# Patient Record
Sex: FEMALE | Race: WHITE | Employment: OTHER | URBAN - METROPOLITAN AREA
[De-identification: names, ages, dates, MRNs, and addresses within clinical notes are randomized per-mention and may not be internally consistent; named-entity substitution may affect disease eponyms.]

---

## 2021-07-16 ENCOUNTER — APPOINTMENT (OUTPATIENT)
Dept: CT IMAGING | Age: 36
End: 2021-07-16
Attending: PHYSICIAN ASSISTANT
Payer: COMMERCIAL

## 2021-07-16 ENCOUNTER — APPOINTMENT (OUTPATIENT)
Dept: ULTRASOUND IMAGING | Age: 36
End: 2021-07-16
Attending: PHYSICIAN ASSISTANT
Payer: COMMERCIAL

## 2021-07-16 ENCOUNTER — HOSPITAL ENCOUNTER (EMERGENCY)
Age: 36
Discharge: HOME | End: 2021-07-16
Attending: STUDENT IN AN ORGANIZED HEALTH CARE EDUCATION/TRAINING PROGRAM
Payer: COMMERCIAL

## 2021-07-16 VITALS
RESPIRATION RATE: 18 BRPM | BODY MASS INDEX: 27.32 KG/M2 | SYSTOLIC BLOOD PRESSURE: 111 MMHG | WEIGHT: 170 LBS | OXYGEN SATURATION: 98 % | HEART RATE: 89 BPM | DIASTOLIC BLOOD PRESSURE: 72 MMHG | HEIGHT: 66 IN | TEMPERATURE: 97.5 F

## 2021-07-16 DIAGNOSIS — N83.201 CYST OF RIGHT OVARY: Primary | ICD-10-CM

## 2021-07-16 LAB
ALBUMIN SERPL-MCNC: 3.9 G/DL (ref 3.5–5)
ALBUMIN/GLOB SERPL: 0.9 {RATIO} (ref 1.1–2.2)
ALP SERPL-CCNC: 113 U/L (ref 45–117)
ALT SERPL-CCNC: 27 U/L (ref 12–78)
ANION GAP SERPL CALC-SCNC: 5 MMOL/L (ref 5–15)
APPEARANCE UR: ABNORMAL
AST SERPL-CCNC: 22 U/L (ref 15–37)
BACTERIA URNS QL MICRO: ABNORMAL /HPF
BASOPHILS # BLD: 0.1 K/UL (ref 0–0.1)
BASOPHILS NFR BLD: 1 % (ref 0–1)
BILIRUB SERPL-MCNC: 0.2 MG/DL (ref 0.2–1)
BILIRUB UR QL: NEGATIVE
BUN SERPL-MCNC: 12 MG/DL (ref 6–20)
BUN/CREAT SERPL: 18 (ref 12–20)
CALCIUM SERPL-MCNC: 9.1 MG/DL (ref 8.5–10.1)
CHLORIDE SERPL-SCNC: 109 MMOL/L (ref 97–108)
CO2 SERPL-SCNC: 25 MMOL/L (ref 21–32)
COLOR UR: ABNORMAL
CREAT SERPL-MCNC: 0.66 MG/DL (ref 0.55–1.02)
DIFFERENTIAL METHOD BLD: ABNORMAL
EOSINOPHIL # BLD: 0.4 K/UL (ref 0–0.4)
EOSINOPHIL NFR BLD: 3 % (ref 0–7)
EPITH CASTS URNS QL MICRO: ABNORMAL /LPF
ERYTHROCYTE [DISTWIDTH] IN BLOOD BY AUTOMATED COUNT: 12.7 % (ref 11.5–14.5)
GLOBULIN SER CALC-MCNC: 4.3 G/DL (ref 2–4)
GLUCOSE SERPL-MCNC: 96 MG/DL (ref 65–100)
GLUCOSE UR STRIP.AUTO-MCNC: NEGATIVE MG/DL
HCG UR QL: NEGATIVE
HCT VFR BLD AUTO: 44.3 % (ref 35–47)
HGB BLD-MCNC: 14.7 G/DL (ref 11.5–16)
HGB UR QL STRIP: ABNORMAL
HYALINE CASTS URNS QL MICRO: ABNORMAL /LPF (ref 0–5)
IMM GRANULOCYTES # BLD AUTO: 0 K/UL (ref 0–0.04)
IMM GRANULOCYTES NFR BLD AUTO: 0 % (ref 0–0.5)
KETONES UR QL STRIP.AUTO: NEGATIVE MG/DL
LEUKOCYTE ESTERASE UR QL STRIP.AUTO: NEGATIVE
LIPASE SERPL-CCNC: 139 U/L (ref 73–393)
LYMPHOCYTES # BLD: 4.4 K/UL (ref 0.8–3.5)
LYMPHOCYTES NFR BLD: 37 % (ref 12–49)
MCH RBC QN AUTO: 30 PG (ref 26–34)
MCHC RBC AUTO-ENTMCNC: 33.2 G/DL (ref 30–36.5)
MCV RBC AUTO: 90.4 FL (ref 80–99)
MONOCYTES # BLD: 0.8 K/UL (ref 0–1)
MONOCYTES NFR BLD: 6 % (ref 5–13)
NEUTS SEG # BLD: 6.3 K/UL (ref 1.8–8)
NEUTS SEG NFR BLD: 53 % (ref 32–75)
NITRITE UR QL STRIP.AUTO: NEGATIVE
NRBC # BLD: 0 K/UL (ref 0–0.01)
NRBC BLD-RTO: 0 PER 100 WBC
PH UR STRIP: 5.5 [PH] (ref 5–8)
PLATELET # BLD AUTO: 340 K/UL (ref 150–400)
PMV BLD AUTO: 10.1 FL (ref 8.9–12.9)
POTASSIUM SERPL-SCNC: 3.9 MMOL/L (ref 3.5–5.1)
PROT SERPL-MCNC: 8.2 G/DL (ref 6.4–8.2)
PROT UR STRIP-MCNC: NEGATIVE MG/DL
RBC # BLD AUTO: 4.9 M/UL (ref 3.8–5.2)
RBC #/AREA URNS HPF: ABNORMAL /HPF (ref 0–5)
SODIUM SERPL-SCNC: 139 MMOL/L (ref 136–145)
SP GR UR REFRACTOMETRY: 1.02 (ref 1–1.03)
UR CULT HOLD, URHOLD: NORMAL
UROBILINOGEN UR QL STRIP.AUTO: 0.2 EU/DL (ref 0.2–1)
WBC # BLD AUTO: 12 K/UL (ref 3.6–11)
WBC URNS QL MICRO: ABNORMAL /HPF (ref 0–4)

## 2021-07-16 PROCEDURE — 74011000636 HC RX REV CODE- 636: Performed by: RADIOLOGY

## 2021-07-16 PROCEDURE — 99283 EMERGENCY DEPT VISIT LOW MDM: CPT

## 2021-07-16 PROCEDURE — 74011250636 HC RX REV CODE- 250/636: Performed by: PHYSICIAN ASSISTANT

## 2021-07-16 PROCEDURE — 74177 CT ABD & PELVIS W/CONTRAST: CPT

## 2021-07-16 PROCEDURE — 96374 THER/PROPH/DIAG INJ IV PUSH: CPT

## 2021-07-16 PROCEDURE — 81001 URINALYSIS AUTO W/SCOPE: CPT

## 2021-07-16 PROCEDURE — 81025 URINE PREGNANCY TEST: CPT

## 2021-07-16 PROCEDURE — 83690 ASSAY OF LIPASE: CPT

## 2021-07-16 PROCEDURE — 76856 US EXAM PELVIC COMPLETE: CPT

## 2021-07-16 PROCEDURE — 36415 COLL VENOUS BLD VENIPUNCTURE: CPT

## 2021-07-16 PROCEDURE — 85025 COMPLETE CBC W/AUTO DIFF WBC: CPT

## 2021-07-16 PROCEDURE — 80053 COMPREHEN METABOLIC PANEL: CPT

## 2021-07-16 RX ORDER — KETOROLAC TROMETHAMINE 30 MG/ML
15 INJECTION, SOLUTION INTRAMUSCULAR; INTRAVENOUS
Status: COMPLETED | OUTPATIENT
Start: 2021-07-16 | End: 2021-07-16

## 2021-07-16 RX ADMIN — IOPAMIDOL 100 ML: 755 INJECTION, SOLUTION INTRAVENOUS at 18:13

## 2021-07-16 RX ADMIN — KETOROLAC TROMETHAMINE 15 MG: 30 INJECTION, SOLUTION INTRAMUSCULAR; INTRAVENOUS at 17:45

## 2021-07-16 NOTE — ED TRIAGE NOTES
Pt ambulatory to triage for right lower abd pain starting this morning without N/V/D or fever.  Seen by [facility] and sent here for elevated WBC to r/o appendicitis

## 2021-07-16 NOTE — ED PROVIDER NOTES
Date of Service:  7/16/2021    Patient:  Columba Humphries    Chief Complaint:  Abdominal Pain       HPI:  Columba Humphries is a 29 y.o.  female who presents for evaluation of right lower quadrant abdominal pain. Started this morning. Pain is minimal at rest, worse with walking or movement. Denies fever, chills, chest pain, shortness of breath, nausea, vomiting, diarrhea, dysuria, vaginal discharge. Denies chance of pregnancy, denies concern for STDs. Pain is characterized as sharp, aching. No medications have been taken. Still has appendix. No past medical history on file. No past surgical history on file. No family history on file. Social History     Socioeconomic History    Marital status: SINGLE     Spouse name: Not on file    Number of children: Not on file    Years of education: Not on file    Highest education level: Not on file   Occupational History    Not on file   Tobacco Use    Smoking status: Not on file   Substance and Sexual Activity    Alcohol use: Not on file    Drug use: Not on file    Sexual activity: Not on file   Other Topics Concern    Not on file   Social History Narrative    Not on file     Social Determinants of Health     Financial Resource Strain:     Difficulty of Paying Living Expenses:    Food Insecurity:     Worried About Running Out of Food in the Last Year:     [address] in the Last Year:    Transportation Needs:     Lack of Transportation (Medical):      Lack of Transportation (Non-Medical):    Physical Activity:     Days of Exercise per Week:     Minutes of Exercise per Session:    Stress:     Feeling of Stress :    Social Connections:     Frequency of Communication with Friends and Family:     Frequency of Social Gatherings with Friends and Family:     Attends Sikhism Services:     Active Member of Clubs or Organizations:     Attends Club or Organization Meetings:     Marital Status:    Intimate Partner Violence:     Fear of Current or Ex-Partner:     Emotionally Abused:     Physically Abused:     Sexually Abused: ALLERGIES: Patient has no known allergies. Review of Systems   Constitutional: Negative for chills and fever. HENT: Negative for trouble swallowing. Eyes: Negative for redness. Respiratory: Negative for shortness of breath. Cardiovascular: Negative for chest pain. Gastrointestinal: Positive for abdominal pain. Negative for diarrhea, nausea and vomiting. Genitourinary: Negative for dysuria. Musculoskeletal: Negative for gait problem. Skin: Negative for rash. Neurological: Negative for syncope. Vitals:    07/16/21 1700   BP: 111/72   Pulse: 89   Resp: 18   Temp: 97.5 °F (36.4 °C)   SpO2: 98%   Weight: 77.1 kg (170 lb)   Height: 5' 6\" (1.676 m)            Physical Exam  Vitals and nursing note reviewed. Constitutional:       Appearance: Normal appearance. HENT:      Head: Normocephalic and atraumatic. Nose: Nose normal.   Eyes:      Extraocular Movements: Extraocular movements intact. Conjunctiva/sclera: Conjunctivae normal.      Pupils: Pupils are equal, round, and reactive to light. Cardiovascular:      Rate and Rhythm: Normal rate and regular rhythm. Pulses: Normal pulses. Radial pulses are 2+ on the right side and 2+ on the left side. Posterior tibial pulses are 2+ on the right side and 2+ on the left side. Heart sounds: Normal heart sounds. Pulmonary:      Effort: Pulmonary effort is normal.      Breath sounds: Normal breath sounds. Abdominal:      General: Abdomen is flat. Bowel sounds are normal.      Palpations: Abdomen is soft. Tenderness: There is abdominal tenderness in the right lower quadrant. There is no right CVA tenderness, left CVA tenderness, guarding or rebound. Musculoskeletal:         General: Normal range of motion. Cervical back: Normal range of motion and neck supple.    Skin:     General: Skin is warm and dry.   Neurological:      General: No focal deficit present. Mental Status: She is alert and oriented to person, place, and time. Mental status is at baseline. Psychiatric:         Mood and Affect: Mood normal.         Behavior: Behavior normal.         Thought Content: Thought content normal.          MDM  Number of Diagnoses or Management Options  Cyst of right ovary  Diagnosis management comments: LABS:  Recent Results (from the past 6 hour(s))  -CBC WITH AUTOMATED DIFF  Collection Time: 07/16/21  5:08 PM       Result                      Value             Ref Range           WBC                         12.0 (H)          3.6 - 11.0 K*       RBC                         4.90              3.80 - 5.20 *       HGB                         14.7              11.5 - 16.0 *       HCT                         44.3              35.0 - 47.0 %       MCV                         90.4              80.0 - 99.0 *       MCH                         30.0              26.0 - 34.0 *       MCHC                        33.2              30.0 - 36.5 *       RDW                         12.7              11.5 - 14.5 %       PLATELET                    340               150 - 400 K/*       MPV                         10.1              8.9 - 12.9 FL       NRBC                        0.0               0  WBC       ABSOLUTE NRBC               0.00              0.00 - 0.01 *       NEUTROPHILS                 53                32 - 75 %           LYMPHOCYTES                 37                12 - 49 %           MONOCYTES                   6                 5 - 13 %            EOSINOPHILS                 3                 0 - 7 %             BASOPHILS                   1                 0 - 1 %             IMMATURE GRANULOCYTES       0                 0.0 - 0.5 %         ABS. NEUTROPHILS            6.3               1.8 - 8.0 K/*       ABS. LYMPHOCYTES            4.4 (H)           0.8 - 3.5 K/*       ABS.  MONOCYTES              0.8 0.0 - 1.0 K/*       ABS. EOSINOPHILS            0.4               0.0 - 0.4 K/*       ABS. BASOPHILS              0.1               0.0 - 0.1 K/*       ABS. IMM. GRANS.            0.0               0.00 - 0.04 *       DF                          AUTOMATED                        -METABOLIC PANEL, COMPREHENSIVE  Collection Time: 07/16/21  5:08 PM       Result                      Value             Ref Range           Sodium                      139               136 - 145 mm*       Potassium                   3.9               3.5 - 5.1 mm*       Chloride                    109 (H)           97 - 108 mmo*       CO2                         25                21 - 32 mmol*       Anion gap                   5                 5 - 15 mmol/L       Glucose                     96                65 - 100 mg/*       BUN                         12                6 - 20 MG/DL        Creatinine                  0.66              0.55 - 1.02 *       BUN/Creatinine ratio        18                12 - 20             GFR est AA                  >60               >60 ml/min/1*       GFR est non-AA              >60               >60 ml/min/1*       Calcium                     9.1               8.5 - 10.1 M*       Bilirubin, total            0.2               0.2 - 1.0 MG*       ALT (SGPT)                  27                12 - 78 U/L         AST (SGOT)                  22                15 - 37 U/L         Alk.  phosphatase            113               45 - 117 U/L        Protein, total              8.2               6.4 - 8.2 g/*       Albumin                     3.9               3.5 - 5.0 g/*       Globulin                    4.3 (H)           2.0 - 4.0 g/*       A-G Ratio                   0.9 (L)           1.1 - 2.2      -LIPASE  Collection Time: 07/16/21  5:08 PM       Result                      Value             Ref Range           Lipase                      139               73 - 393 U/L   -URINALYSIS W/MICROSCOPIC  Collection Time: 07/16/21  5:42 PM       Result                      Value             Ref Range           Color                       YELLOW/STRAW                          Appearance                  CLOUDY (A)        CLEAR               Specific gravity            1.022             1.003 - 1.03*       pH (UA)                     5.5               5.0 - 8.0           Protein                     Negative          NEG mg/dL           Glucose                     Negative          NEG mg/dL           Ketone                      Negative          NEG mg/dL           Bilirubin                   Negative          NEG                 Blood                       TRACE (A)         NEG                 Urobilinogen                0.2               0.2 - 1.0 EU*       Nitrites                    Negative          NEG                 Leukocyte Esterase          Negative          NEG                 WBC                         0-4               0 - 4 /hpf          RBC                         5-10              0 - 5 /hpf          Epithelial cells            MANY (A)          FEW /lpf            Bacteria                    1+ (A)            NEG /hpf            Hyaline cast                2-5               0 - 5 /lpf     -URINE CULTURE HOLD SAMPLE  Collection Time: 07/16/21  5:42 PM  Specimen: Serum; Urine       Result                      Value             Ref Range           Urine culture hold                                            Urine on hold in Microbiology dept for 2 days. If unpreserved urine is submitted, it cannot be used for addtional testing after 24 hours, recollection will be required. -HCG URINE, QL. - POC  Collection Time: 07/16/21  5:45 PM       Result                      Value             Ref Range           Pregnancy test,urine (*     Negative          NEG               IMAGING:  US PELV NON OBS   Final Result    1. A right ovarian cyst measures 6.4 cm. The right ovary demonstrates flow. Follow-up ultrasound in 6-10 weeks is suggested. 2. Normal appearance of the uterus and left ovary. CT ABD PELV W CONT   Final Result    1. Right ovarian cyst or cystic lesion measuring 6.4 cm. No adjacent    inflammation. 2. Normal appendix. No other acute abnormality in the abdomen or pelvis. Medications During Visit:  Medications  ketorolac (TORADOL) injection 15 mg (15 mg IntraVENous Given 7/16/21 1745)  iopamidoL (ISOVUE-370) 76 % injection 100 mL (100 mL IntraVENous Given 7/16/21 1813)      DECISION MAKING:  Opal Reid is a 29 y.o. female presenting today with right lower quadrant abdominal pain that started this morning. Characterizes as sharp, aching, improves with rest, worse with walking or movement. No fever or chills, anorexia, chest pain, shortness of breath, nausea, vomiting, diarrhea, dysuria, vaginal discharge. Denies chance of pregnancy. Denies concern for STD. Vitals stable, patient resting comfortably no acute distress. Right lower quadrant tenderness to palpation with no rebound or guarding. CBC, CMP, lipase, urinalysis, POC urine pregnant showed no clear etiology. CT shows right ovarian cyst or cystic lesion measuring 6.4 cm. Normal appendix. Ultrasound pelvic shows 6.4 cm, demonstrates flow, recommend follow-up ultrasound in 6 to 10 weeks. Patient given Toradol with significant improvement of pain. Follow-up with OB/GYN within 1 week. Strict ED return precautions discussed. Patient management discussed with attending physician. IMPRESSION:  Cyst of right ovary  (primary encounter diagnosis)    DISPOSITION:  Discharged      There are no discharge medications for this patient.        Follow-up Information     Follow up With Specialties Details Why [address] [place] [address],[address]  Schedule an appointment as soon as   possible for a visit in 1 week  Hernando Smith [place] [address]  [phone]    [facility] EMERGENCY DEPT Emergency Medicine Go to  If symptoms worsen [address] [facility] [address]  [phone]          The patient is asked to follow-up with their primary care provider in the next several days. They are to call tomorrow for an appointment. The patient is asked to return promptly for any increased concerns or worsening of symptoms. They can return to this emergency department or any other emergency department. ED Course as of Jul 16 2249 Fri Jul 16, 2021   1703 5:03 PM  I have evaluated the patient as the Provider in Triage. I have reviewed Her vital signs and the triage nurse assessment. I have talked with the patient and any available family and advised that I am the provider in triage and have ordered the appropriate study to initiate their work up based on the clinical presentation during my assessment. I have advised that the patient will be accommodated in the Main ED as soon as possible. I have also requested to contact the triage nurse or myself immediately if the patient experiences any changes in their condition during this brief waiting period. Omar Case PA-C    1 day RLQ pain, worse after walking, moderate, sharp. No f/c, cp/sob, n/v/d, dysuria, vaginal discharge. Denies chance of pregnancy. [EK]   1855 Spoke to patient, all results discussed so far. No additional questions/concerns. [EK]   1949 Patient resting comfortably, discussed all results with her. Patient has no additional questions or concerns.     [EK]      ED Course User Index  [EK] Cristal Hough PA-C       Procedures

## 2021-07-17 NOTE — ED NOTES
Patient seen by provider prior to discharge and no further questions. Discharge papers given to patient by RN. Ambulatory to home and no apparent distress.      Visit Vitals  /72 (BP 1 Location: Right upper arm, BP Patient Position: Sitting)   Pulse 89   Temp 97.5 °F (36.4 °C)   Resp 18   Ht 5' 6\" (1.676 m)   Wt 77.1 kg (170 lb)   SpO2 98%   BMI 27.44 kg/m²